# Patient Record
Sex: MALE | Race: WHITE | NOT HISPANIC OR LATINO | Employment: OTHER | URBAN - METROPOLITAN AREA
[De-identification: names, ages, dates, MRNs, and addresses within clinical notes are randomized per-mention and may not be internally consistent; named-entity substitution may affect disease eponyms.]

---

## 2024-08-15 ENCOUNTER — OFFICE VISIT (OUTPATIENT)
Dept: URGENT CARE | Facility: URGENT CARE | Age: 74
End: 2024-08-15

## 2024-08-15 VITALS
HEART RATE: 67 BPM | SYSTOLIC BLOOD PRESSURE: 119 MMHG | OXYGEN SATURATION: 99 % | DIASTOLIC BLOOD PRESSURE: 75 MMHG | TEMPERATURE: 97.6 F

## 2024-08-15 DIAGNOSIS — S09.90XA INJURY OF HEAD, INITIAL ENCOUNTER: Primary | ICD-10-CM

## 2024-08-15 PROCEDURE — 99207 PR NO CHARGE LOS: CPT | Performed by: NURSE PRACTITIONER

## 2024-08-15 ASSESSMENT — PAIN SCALES - GENERAL: PAINLEVEL: WORST PAIN (10)

## 2024-08-15 NOTE — PROGRESS NOTES
Pt was recently in the ICU in [place] for a seizure and subdural bleed. Pt presents with rib pain, facial pain and still feeling dizzy. Pt reports he was not sent home with anti seizure medications and son is trying to get appointment with neurosurgeon but has been unable to [facility] far [facility]. With the extensiveness of his facial bruising, rib pain and need for close follow up care, feel that the ER is a better option for him to check Keppra levels and hopefully get him connected with social work. Family member is with him and will take [facility] to [facility].